# Patient Record
Sex: MALE | Race: ASIAN | NOT HISPANIC OR LATINO | URBAN - METROPOLITAN AREA
[De-identification: names, ages, dates, MRNs, and addresses within clinical notes are randomized per-mention and may not be internally consistent; named-entity substitution may affect disease eponyms.]

---

## 2023-08-18 ENCOUNTER — EMERGENCY (EMERGENCY)
Facility: HOSPITAL | Age: 73
LOS: 1 days | Discharge: HOME | End: 2023-08-18
Admitting: EMERGENCY MEDICINE
Payer: MEDICARE

## 2023-08-18 VITALS
SYSTOLIC BLOOD PRESSURE: 113 MMHG | TEMPERATURE: 98 F | OXYGEN SATURATION: 97 % | DIASTOLIC BLOOD PRESSURE: 70 MMHG | HEART RATE: 80 BPM | RESPIRATION RATE: 18 BRPM

## 2023-08-18 VITALS
DIASTOLIC BLOOD PRESSURE: 68 MMHG | RESPIRATION RATE: 20 BRPM | TEMPERATURE: 97 F | SYSTOLIC BLOOD PRESSURE: 115 MMHG | HEART RATE: 67 BPM | OXYGEN SATURATION: 95 %

## 2023-08-18 DIAGNOSIS — R09.89 OTHER SPECIFIED SYMPTOMS AND SIGNS INVOLVING THE CIRCULATORY AND RESPIRATORY SYSTEMS: ICD-10-CM

## 2023-08-18 PROCEDURE — 99284 EMERGENCY DEPT VISIT MOD MDM: CPT

## 2023-08-18 RX ORDER — BENZOCAINE 10 %
1 GEL (GRAM) MUCOUS MEMBRANE ONCE
Refills: 0 | Status: COMPLETED | OUTPATIENT
Start: 2023-08-18 | End: 2023-08-18

## 2023-08-18 RX ADMIN — Medication 1 SPRAY(S): at 14:16

## 2023-08-18 NOTE — CONSULT NOTE ADULT - PROBLEM SELECTOR RECOMMENDATION 9
- Remove fish bone   - Maalox to help sooth throat after removal   - - Remove fish bone   - Maalox to help sooth throat after removal   - If patient has any further symptoms after 48hours and believes another bone to be stuck in throat/esophagus please have patient report back to the ED- If any changed in Neck ROM, fever, sob

## 2023-08-18 NOTE — ED PROVIDER NOTE - PATIENT PORTAL LINK FT
You can access the FollowMyHealth Patient Portal offered by [facility] by registering at the following website: [email or web address]. By joining [employer]’s FollowMyHealth portal, you will also be able to view your health information using other applications (apps) compatible with our system.

## 2023-08-18 NOTE — ED PROVIDER NOTE - CLINICAL SUMMARY MEDICAL DECISION MAKING FREE TEXT BOX
72 y/o M with pmhx of COPD presents to the ED c/o foreign body in throat x 2 days. Pt was eating fish two days and feels a small piece of bone got stuck in his throat. C/o associated odynophagia. Denies dysphagia, increased drooling, hematemesis or blood tinged sputum, hoarseness or changes in voice, choking, retrosternal pain or fullness, CP, SOB. No other complaints at this time.    Patient currently afebrile, hemodynamically stable, spO2 100%. Airway patent. Pt without drooling or audible stridor, able to maintain secretions. No visualized foreign body in posterior oropharynx. Based on history and physical, differentials include but are not limited to foreign body in throat vs marlena-velázquez tear. Low suspicion for esophageal perforation given lack of neck fullness, crepitus. Plan to assess patient for acute pathology as listed above with CT neck to evaluate for fishbone and consult ENT for scope and removal.

## 2023-08-18 NOTE — ED PROVIDER NOTE - PROGRESS NOTE DETAILS
YORDAN George:Patient also seen by myself, agree with documented history and physical exam.  Patient is a 73-year-old male with past medical history of COPD presenting to the ER with a fishbone stuck in his throat.  Patient reports eating fish 2 days ago at which time he felt a small piece of bone stuck in the right side of his throat.  On exam patient is well-appearing, speaking in complete sentences, no drooling, no anterior neck tenderness, no visible fishbone in the posterior oropharynx.  ENT was consulted, they were able to remove the fishbone from the throats, had recommended CT neck soft tissue but patient does not want to remain in ED to have CT scan performed.  States he is feeling significantly improved after having the bone removed and prefers to follow-up with his primary care doctor and ENT as an outpatient.  He will return to the ER with any worsening or concerning symptoms, difficulty swallowing, throat pain, fever/chills or any other concerns. YORDAN Jones: Pt evaluated by ENT, katheryn wheat. Pt would like to go home and does not wish to stay for CT scan. Explained that CT would  any additional foreign bodies if present. Pt understands but does not wish to stay.   Patient is medically stable for discharge. Strict return precautions given. Patient displays understanding and agreeable with plan, comfortable with discharge plan home.

## 2023-08-18 NOTE — ED PROVIDER NOTE - NSFOLLOWUPINSTRUCTIONS_ED_ALL_ED_FT
Follow-up with your primary care doctor within 1 week.  Follow-up with an ENT within 1 week, referral list attached please call to make an appointment.  Return to the ER with any worsening or concerning symptoms, throat pain, difficulty swallowing, drooling, fever/chills or any other concerns.

## 2023-08-18 NOTE — ED PROVIDER NOTE - OBJECTIVE STATEMENT
72 y/o M with pmhx of COPD presents to the ED c/o foreign body in throat x 2 days. Pt was eating fish two days and feels a small piece of bone got stuck in his throat. C/o associated odynophagia. Denies decreased PO intake 2/2 pain, dysphagia, increased drooling, hematemesis or blood tinged sputum, hoarseness or changes in voice, choking, retrosternal pain or fullness, CP, SOB. No other complaints at this time.

## 2023-08-18 NOTE — ED ADULT TRIAGE NOTE - MODE OF ARRIVAL
Walk in [Patient Intake Form Reviewed] : Patient intake form was reviewed [As Noted in HPI] : as noted in HPI [Negative] : Heme/Lymph

## 2023-08-18 NOTE — CONSULT NOTE ADULT - ASSESSMENT
73M pmhx copd p/w r/o Fish bone in throat. FOE: + ? fish bone in RIGHT base of tongue - airway patent b/l VC mobile  73M pmhx copd p/w r/o Fish bone in throat. FOE: + ? fish bone in RIGHT base of tongue - airway patent b/l VC mobile - Fish bone ~ 1-2cm in size (white) removed from RIGHT BOT - no complications

## 2023-08-18 NOTE — CONSULT NOTE ADULT - SUBJECTIVE AND OBJECTIVE BOX
CC: R/o Fish bone    HPI:       PAST MEDICAL & SURGICAL HISTORY:  Chronic obstructive pulmonary disease (COPD)      No significant past surgical history        Allergies    No Known Allergies    Intolerances      MEDICATIONS  (STANDING):    MEDICATIONS  (PRN):      Social History: **??**    Family history: No pertinent family history in first degree relatives    ROS:   ENT: all negative except as noted in HPI   CV: denies palpitations  Pulm: denies SOB, cough, hemoptysis  GI: denies change in appetite, indigestion, n/v  : denies pertinent urinary symptoms, urgency  Neuro: denies numbness/tingling, loss of sensation  Psych: denies anxiety  MS: denies muscle weakness, instability  Heme: denies easy bruising or bleeding  Endo: denies heat/cold intolerance, excessive sweating  Vascular: denies LE edema    Vital Signs Last 24 Hrs  T(C): 36.6 (18 Aug 2023 10:46), Max: 36.6 (18 Aug 2023 10:46)  T(F): 97.9 (18 Aug 2023 10:46), Max: 97.9 (18 Aug 2023 10:46)  HR: 80 (18 Aug 2023 10:46) (80 - 80)  BP: 113/70 (18 Aug 2023 10:46) (113/70 - 113/70)  BP(mean): --  RR: 18 (18 Aug 2023 10:46) (18 - 18)  SpO2: 97% (18 Aug 2023 10:46) (97% - 97%)    Parameters below as of 18 Aug 2023 10:46  Patient On (Oxygen Delivery Method): room air                  PHYSICAL EXAM:  Gen: NAD  Skin: No rashes, bruises, or lesions  Head: Normocephalic, Atraumatic  Face: no edema, erythema, or fluctuance. Parotid glands soft without mass  Eyes: no scleral injection  Ears: Right - ear canal clear, TM intact without effusion or erythema. No evidence of any fluid drainage. No mastoid tenderness, erythema, or ear bulging            Left - ear canal clear, TM intact without effusion or erythema. No evidence of any fluid drainage. No mastoid tenderness, erythema, or ear bulging  Nose: Nares bilaterally patent, no discharge  Mouth: No stridor, no drooling, no trismus.  Mucosa moist, tongue/uvula midline, oropharynx clear  Neck: Flat, supple, no lymphadenopathy, trachea midline, no masses  Lymphatic: No lymphadenopathy  Resp: breathing easily, no stridor  CV: no peripheral edema/cyanosis  GI: nondistended   Peripheral vascular: no JVD or edema  Neuro: facial nerve intact, no facial droop      Diagnostic Nasal Endoscopy: (Scope #2 used)    Fiberoptic Indirect laryngoscopy:  (Scope #2 used)    IMAGING/ADDITIONAL STUDIES:  CC: R/o Fish bone    HPI: 73M pmhx copd p/w r/o Fish bone in throat. ENT consulted- Pt accompanied at bedside with son. Son helped to provide translation. Pt had fish for dinner on WED night and immediately after felt a sharp pain - (points middle of right neck). Pt admits after eating fish - has been able to eat+ drink but has some pain " when things go down". Pt denies fever/chills, SOB, decreased neck ROM. Pt denies hx of head and neck surgery       PAST MEDICAL & SURGICAL HISTORY:  Chronic obstructive pulmonary disease (COPD)      No significant past surgical history        Allergies    No Known Allergies    Intolerances      MEDICATIONS  (STANDING):    MEDICATIONS  (PRN):           Family history: No pertinent family history in first degree relatives    ROS:   ENT: all negative except as noted in HPI   CV: denies palpitations  Pulm: denies SOB, cough, hemoptysis  GI: denies change in appetite, indigestion, n/v  : denies pertinent urinary symptoms, urgency  Neuro: denies numbness/tingling, loss of sensation  Psych: denies anxiety  MS: denies muscle weakness, instability  Heme: denies easy bruising or bleeding  Endo: denies heat/cold intolerance, excessive sweating  Vascular: denies LE edema    Vital Signs Last 24 Hrs  T(C): 36.6 (18 Aug 2023 10:46), Max: 36.6 (18 Aug 2023 10:46)  T(F): 97.9 (18 Aug 2023 10:46), Max: 97.9 (18 Aug 2023 10:46)  HR: 80 (18 Aug 2023 10:46) (80 - 80)  BP: 113/70 (18 Aug 2023 10:46) (113/70 - 113/70)  RR: 18 (18 Aug 2023 10:46) (18 - 18)  SpO2: 97% (18 Aug 2023 10:46) (97% - 97%)    Parameters below as of 18 Aug 2023 10:46  Patient On (Oxygen Delivery Method): room air                  PHYSICAL EXAM:  Gen: NAD  Skin: No rashes, bruises, or lesions  Head: Normocephalic, Atraumatic  Face: no edema, erythema, or fluctuance.    Nose: Nares bilaterally patent, no discharge  Mouth: No stridor, no drooling, no trismus.  Mucosa moist, tongue/uvula midline, oropharynx clear- no fish bone visualized   Neck: Flat, supple   Lymphatic: No lymphadenopathy  Resp: breathing easily, no stridor  CV: no peripheral edema/cyanosis  GI: nondistended             Fiberoptic Indirect laryngoscopy:    Flexible laryngoscopy was performed and revealed the following:    -- Nasopharynx had no mass or exudate.    -- Posterior pharyngeal wall clear, no edema     -- Base of tongue  RIGHT BOT small white ? fish bone     -- Vallecula was clear    -- Epiglottis w/o abnormalities    -- Arytenoids both without edema and erythema     -- True vocal folds were fully mobile and without lesions.     -- Post cricoid area clear, no edema and no erythema    -- Airway patent        CC: R/o Fish bone    HPI: 73M pmhx copd p/w r/o Fish bone in throat. ENT consulted- Pt accompanied at bedside with son. Son helped to provide translation. Pt had fish for dinner on WED night and immediately after felt a sharp pain - (points middle of right neck). Pt admits after eating fish - has been able to eat+ drink but has some pain " when things go down". Pt denies fever/chills, SOB, decreased neck ROM. Pt denies hx of head and neck surgery       PAST MEDICAL & SURGICAL HISTORY:  Chronic obstructive pulmonary disease (COPD)      No significant past surgical history        Allergies    No Known Allergies    Intolerances      MEDICATIONS  (STANDING):    MEDICATIONS  (PRN):           Family history: No pertinent family history in first degree relatives    ROS:   ENT: all negative except as noted in HPI   CV: denies palpitations  Pulm: denies SOB, cough, hemoptysis  GI: denies change in appetite, indigestion, n/v  : denies pertinent urinary symptoms, urgency  Neuro: denies numbness/tingling, loss of sensation  Psych: denies anxiety  MS: denies muscle weakness, instability  Heme: denies easy bruising or bleeding  Endo: denies heat/cold intolerance, excessive sweating  Vascular: denies LE edema    Vital Signs Last 24 Hrs  T(C): 36.6 (18 Aug 2023 10:46), Max: 36.6 (18 Aug 2023 10:46)  T(F): 97.9 (18 Aug 2023 10:46), Max: 97.9 (18 Aug 2023 10:46)  HR: 80 (18 Aug 2023 10:46) (80 - 80)  BP: 113/70 (18 Aug 2023 10:46) (113/70 - 113/70)  RR: 18 (18 Aug 2023 10:46) (18 - 18)  SpO2: 97% (18 Aug 2023 10:46) (97% - 97%)    Parameters below as of 18 Aug 2023 10:46  Patient On (Oxygen Delivery Method): room air                  PHYSICAL EXAM:  Gen: NAD  Skin: No rashes, bruises, or lesions  Head: Normocephalic, Atraumatic  Face: no edema, erythema, or fluctuance.    Nose: Nares bilaterally patent, no discharge  Mouth: No stridor, no drooling, no trismus.  Mucosa moist, tongue/uvula midline, oropharynx clear- no fish bone visualized   Neck: Flat, supple   Lymphatic: No lymphadenopathy  Resp: breathing easily, no stridor  CV: no peripheral edema/cyanosis  GI: nondistended             Fiberoptic Indirect laryngoscopy:    Flexible laryngoscopy was performed and revealed the following:    -- Nasopharynx had no mass or exudate.    -- Posterior pharyngeal wall clear, no edema     -- Base of tongue  RIGHT BOT small white ? fish bone     -- Vallecula was clear    -- Epiglottis w/o abnormalities    -- Arytenoids both without edema and erythema     -- True vocal folds were fully mobile and without lesions.     -- Post cricoid area clear, no edema and no erythema    -- Airway patent         Procedure NOTE  - foreign body removal   - cetacaine spray applied to posterior oropharynx   - indirect laryngoscope confirmed position of fishbone   - Long clamp used to remove bone   - Bone removed   - no further fish bone identified after fish bone removed

## 2023-08-18 NOTE — ED ADULT TRIAGE NOTE - CHIEF COMPLAINT QUOTE
Pt presents to ED ambulatory from home with c/o fishbone in throat x 2 days. Pt endorses difficulty swallowing. Pt denies difficulty breathing, speaking in full sentences, no drooling noted.

## 2023-08-18 NOTE — ED PROVIDER NOTE - PHYSICAL EXAMINATION
General: Well appearing in no acute distress, alert and cooperative  Head: Normocephalic, atraumatic  Eyes: PERRLA  ENMT: Atraumatic external nose and ears, moist mucous membranes, oropharynx clear with no erythema. no visualized foreign body.   Neck: Soft and supple, full ROM without pain, no midline tenderness. +mild tenderness to R anterior neck. No crepitus. No masses palpated. No lymphadenopathy.   Cardiac: Regular rate and regular rhythm, no murmurs  Resp: Airway patent. Unlabored respiratory effort, lungs CTAB, speaking in full sentences.   Abd: Soft, non-tender, non-distended, no guarding or rebound tenderness  Skin: Warm and dry, no rashes/abrasions/lacerations  Neuro: AO x 3, no focal neuro deficits.